# Patient Record
Sex: MALE | Race: WHITE | NOT HISPANIC OR LATINO | URBAN - METROPOLITAN AREA
[De-identification: names, ages, dates, MRNs, and addresses within clinical notes are randomized per-mention and may not be internally consistent; named-entity substitution may affect disease eponyms.]

---

## 2018-10-23 ENCOUNTER — INPATIENT (INPATIENT)
Facility: HOSPITAL | Age: 53
LOS: 0 days | Discharge: HOME | End: 2018-10-24
Attending: INTERNAL MEDICINE | Admitting: INTERNAL MEDICINE

## 2018-10-23 VITALS
OXYGEN SATURATION: 100 % | RESPIRATION RATE: 16 BRPM | DIASTOLIC BLOOD PRESSURE: 90 MMHG | SYSTOLIC BLOOD PRESSURE: 140 MMHG | TEMPERATURE: 98 F | HEART RATE: 100 BPM

## 2018-10-23 LAB
ALBUMIN SERPL ELPH-MCNC: 4.4 G/DL — SIGNIFICANT CHANGE UP (ref 3.5–5.2)
ALP SERPL-CCNC: 48 U/L — SIGNIFICANT CHANGE UP (ref 30–115)
ALT FLD-CCNC: 27 U/L — SIGNIFICANT CHANGE UP (ref 0–41)
ANION GAP SERPL CALC-SCNC: 17 MMOL/L — HIGH (ref 7–14)
APTT BLD: 27.8 SEC — SIGNIFICANT CHANGE UP (ref 27–39.2)
AST SERPL-CCNC: 33 U/L — SIGNIFICANT CHANGE UP (ref 0–41)
BASOPHILS # BLD AUTO: 0.03 K/UL — SIGNIFICANT CHANGE UP (ref 0–0.2)
BASOPHILS NFR BLD AUTO: 0.5 % — SIGNIFICANT CHANGE UP (ref 0–1)
BILIRUB SERPL-MCNC: 0.2 MG/DL — SIGNIFICANT CHANGE UP (ref 0.2–1.2)
BUN SERPL-MCNC: 13 MG/DL — SIGNIFICANT CHANGE UP (ref 10–20)
CALCIUM SERPL-MCNC: 9.2 MG/DL — SIGNIFICANT CHANGE UP (ref 8.5–10.1)
CHLORIDE SERPL-SCNC: 100 MMOL/L — SIGNIFICANT CHANGE UP (ref 98–110)
CO2 SERPL-SCNC: 23 MMOL/L — SIGNIFICANT CHANGE UP (ref 17–32)
CREAT SERPL-MCNC: 1 MG/DL — SIGNIFICANT CHANGE UP (ref 0.7–1.5)
EOSINOPHIL # BLD AUTO: 0.12 K/UL — SIGNIFICANT CHANGE UP (ref 0–0.7)
EOSINOPHIL NFR BLD AUTO: 2.2 % — SIGNIFICANT CHANGE UP (ref 0–8)
GLUCOSE SERPL-MCNC: 114 MG/DL — HIGH (ref 70–99)
HCT VFR BLD CALC: 45.8 % — SIGNIFICANT CHANGE UP (ref 42–52)
HGB BLD-MCNC: 16.4 G/DL — SIGNIFICANT CHANGE UP (ref 14–18)
IMM GRANULOCYTES NFR BLD AUTO: 0.4 % — HIGH (ref 0.1–0.3)
INR BLD: 0.92 RATIO — SIGNIFICANT CHANGE UP (ref 0.65–1.3)
LYMPHOCYTES # BLD AUTO: 1.73 K/UL — SIGNIFICANT CHANGE UP (ref 1.2–3.4)
LYMPHOCYTES # BLD AUTO: 31.1 % — SIGNIFICANT CHANGE UP (ref 20.5–51.1)
MCHC RBC-ENTMCNC: 32.3 PG — HIGH (ref 27–31)
MCHC RBC-ENTMCNC: 35.8 G/DL — SIGNIFICANT CHANGE UP (ref 32–37)
MCV RBC AUTO: 90.2 FL — SIGNIFICANT CHANGE UP (ref 80–94)
MONOCYTES # BLD AUTO: 0.71 K/UL — HIGH (ref 0.1–0.6)
MONOCYTES NFR BLD AUTO: 12.7 % — HIGH (ref 1.7–9.3)
NEUTROPHILS # BLD AUTO: 2.96 K/UL — SIGNIFICANT CHANGE UP (ref 1.4–6.5)
NEUTROPHILS NFR BLD AUTO: 53.1 % — SIGNIFICANT CHANGE UP (ref 42.2–75.2)
NRBC # BLD: 0 /100 WBCS — SIGNIFICANT CHANGE UP (ref 0–0)
PLATELET # BLD AUTO: 244 K/UL — SIGNIFICANT CHANGE UP (ref 130–400)
POTASSIUM SERPL-MCNC: 4.6 MMOL/L — SIGNIFICANT CHANGE UP (ref 3.5–5)
POTASSIUM SERPL-SCNC: 4.6 MMOL/L — SIGNIFICANT CHANGE UP (ref 3.5–5)
PROT SERPL-MCNC: 6.7 G/DL — SIGNIFICANT CHANGE UP (ref 6–8)
PROTHROM AB SERPL-ACNC: 10.6 SEC — SIGNIFICANT CHANGE UP (ref 9.95–12.87)
RBC # BLD: 5.08 M/UL — SIGNIFICANT CHANGE UP (ref 4.7–6.1)
RBC # FLD: 12.2 % — SIGNIFICANT CHANGE UP (ref 11.5–14.5)
SODIUM SERPL-SCNC: 140 MMOL/L — SIGNIFICANT CHANGE UP (ref 135–146)
TROPONIN T SERPL-MCNC: <0.01 NG/ML — SIGNIFICANT CHANGE UP
WBC # BLD: 5.57 K/UL — SIGNIFICANT CHANGE UP (ref 4.8–10.8)
WBC # FLD AUTO: 5.57 K/UL — SIGNIFICANT CHANGE UP (ref 4.8–10.8)

## 2018-10-23 RX ORDER — SODIUM CHLORIDE 9 MG/ML
2000 INJECTION INTRAMUSCULAR; INTRAVENOUS; SUBCUTANEOUS ONCE
Qty: 0 | Refills: 0 | Status: COMPLETED | OUTPATIENT
Start: 2018-10-23 | End: 2018-10-23

## 2018-10-23 RX ORDER — ENOXAPARIN SODIUM 100 MG/ML
100 INJECTION SUBCUTANEOUS ONCE
Qty: 0 | Refills: 0 | Status: COMPLETED | OUTPATIENT
Start: 2018-10-23 | End: 2018-10-23

## 2018-10-23 RX ORDER — SODIUM CHLORIDE 9 MG/ML
3 INJECTION INTRAMUSCULAR; INTRAVENOUS; SUBCUTANEOUS EVERY 8 HOURS
Qty: 0 | Refills: 0 | Status: DISCONTINUED | OUTPATIENT
Start: 2018-10-23 | End: 2018-10-24

## 2018-10-23 RX ADMIN — SODIUM CHLORIDE 500 MILLILITER(S): 9 INJECTION INTRAMUSCULAR; INTRAVENOUS; SUBCUTANEOUS at 22:35

## 2018-10-23 NOTE — ED PROVIDER NOTE - PHYSICAL EXAMINATION
VITAL SIGNS: I have reviewed nursing notes and confirm.  CONSTITUTIONAL: Well-developed; well-nourished; in no acute distress.  SKIN: Skin exam is warm and dry, no acute rash.  HEAD: Normocephalic; atraumatic.  EYES: PERRL, EOM intact; conjunctiva and sclera clear.  ENT: No nasal discharge; airway clear. TMs clear.  NECK: Supple; non tender.  CARD: S1, S2 normal; no murmurs, gallops, or rubs. Regular rate and rhythm.  RESP: No wheezes, rales or rhonchi.  ABD: Normal bowel sounds; soft; non-distended; non-tender; no hepatosplenomegaly.  EXT: Normal ROM. No clubbing, cyanosis or edema.  LYMPH: No acute cervical adenopathy.  NEURO: Alert, oriented. Grossly unremarkable. No focal deficits.  PSYCH: Cooperative, appropriate. VITAL SIGNS: I have reviewed nursing notes and confirm.  CONSTITUTIONAL: Well-developed; well-nourished; in no acute distress.  SKIN: Skin exam is warm and dry, no acute rash.  HEAD: Normocephalic; atraumatic.  EYES: PERRL, EOM intact; conjunctiva and sclera clear.  ENT: No nasal discharge; airway clear. TMs clear.  NECK: Supple; non tender.  CARD: S1, S2 normal; no murmurs, gallops, or rubs. Irregular rate and rhythm.  RESP: No wheezes, rales or rhonchi.  ABD: Normal bowel sounds; soft; non-distended; non-tender; no hepatosplenomegaly.  EXT: Normal ROM. No clubbing, cyanosis or edema.  LYMPH: No acute cervical adenopathy.  NEURO: Alert, oriented. Grossly unremarkable. No focal deficits.  PSYCH: Cooperative, appropriate.

## 2018-10-24 ENCOUNTER — TRANSCRIPTION ENCOUNTER (OUTPATIENT)
Age: 53
End: 2018-10-24

## 2018-10-24 VITALS
DIASTOLIC BLOOD PRESSURE: 66 MMHG | TEMPERATURE: 98 F | HEART RATE: 68 BPM | SYSTOLIC BLOOD PRESSURE: 121 MMHG | RESPIRATION RATE: 18 BRPM

## 2018-10-24 DIAGNOSIS — Z98.890 OTHER SPECIFIED POSTPROCEDURAL STATES: Chronic | ICD-10-CM

## 2018-10-24 LAB
ALBUMIN SERPL ELPH-MCNC: 4.1 G/DL — SIGNIFICANT CHANGE UP (ref 3.5–5.2)
ALP SERPL-CCNC: 48 U/L — SIGNIFICANT CHANGE UP (ref 30–115)
ALT FLD-CCNC: 25 U/L — SIGNIFICANT CHANGE UP (ref 0–41)
ANION GAP SERPL CALC-SCNC: 12 MMOL/L — SIGNIFICANT CHANGE UP (ref 7–14)
AST SERPL-CCNC: 25 U/L — SIGNIFICANT CHANGE UP (ref 0–41)
BASOPHILS # BLD AUTO: 0.02 K/UL — SIGNIFICANT CHANGE UP (ref 0–0.2)
BASOPHILS NFR BLD AUTO: 0.3 % — SIGNIFICANT CHANGE UP (ref 0–1)
BILIRUB SERPL-MCNC: 0.6 MG/DL — SIGNIFICANT CHANGE UP (ref 0.2–1.2)
BUN SERPL-MCNC: 10 MG/DL — SIGNIFICANT CHANGE UP (ref 10–20)
CALCIUM SERPL-MCNC: 9.1 MG/DL — SIGNIFICANT CHANGE UP (ref 8.5–10.1)
CHLORIDE SERPL-SCNC: 103 MMOL/L — SIGNIFICANT CHANGE UP (ref 98–110)
CHOLEST SERPL-MCNC: 204 MG/DL — HIGH (ref 100–200)
CK MB CFR SERPL CALC: 3.6 NG/ML — SIGNIFICANT CHANGE UP (ref 0.6–6.3)
CK SERPL-CCNC: 231 U/L — HIGH (ref 0–225)
CO2 SERPL-SCNC: 29 MMOL/L — SIGNIFICANT CHANGE UP (ref 17–32)
CREAT SERPL-MCNC: 1.1 MG/DL — SIGNIFICANT CHANGE UP (ref 0.7–1.5)
EOSINOPHIL # BLD AUTO: 0.66 K/UL — SIGNIFICANT CHANGE UP (ref 0–0.7)
EOSINOPHIL NFR BLD AUTO: 11.5 % — HIGH (ref 0–8)
ESTIMATED AVERAGE GLUCOSE: 100 MG/DL — SIGNIFICANT CHANGE UP (ref 68–114)
GLUCOSE SERPL-MCNC: 109 MG/DL — HIGH (ref 70–99)
HBA1C BLD-MCNC: 5.1 % — SIGNIFICANT CHANGE UP (ref 4–5.6)
HCT VFR BLD CALC: 47.6 % — SIGNIFICANT CHANGE UP (ref 42–52)
HDLC SERPL-MCNC: 62 MG/DL — SIGNIFICANT CHANGE UP
HGB BLD-MCNC: 16.3 G/DL — SIGNIFICANT CHANGE UP (ref 14–18)
IMM GRANULOCYTES NFR BLD AUTO: 0.2 % — SIGNIFICANT CHANGE UP (ref 0.1–0.3)
LIPID PNL WITH DIRECT LDL SERPL: 133 MG/DL — HIGH (ref 4–129)
LYMPHOCYTES # BLD AUTO: 1.77 K/UL — SIGNIFICANT CHANGE UP (ref 1.2–3.4)
LYMPHOCYTES # BLD AUTO: 30.9 % — SIGNIFICANT CHANGE UP (ref 20.5–51.1)
MAGNESIUM SERPL-MCNC: 2.2 MG/DL — SIGNIFICANT CHANGE UP (ref 1.8–2.4)
MCHC RBC-ENTMCNC: 31.3 PG — HIGH (ref 27–31)
MCHC RBC-ENTMCNC: 34.2 G/DL — SIGNIFICANT CHANGE UP (ref 32–37)
MCV RBC AUTO: 91.4 FL — SIGNIFICANT CHANGE UP (ref 80–94)
MONOCYTES # BLD AUTO: 0.78 K/UL — HIGH (ref 0.1–0.6)
MONOCYTES NFR BLD AUTO: 13.6 % — HIGH (ref 1.7–9.3)
NEUTROPHILS # BLD AUTO: 2.48 K/UL — SIGNIFICANT CHANGE UP (ref 1.4–6.5)
NEUTROPHILS NFR BLD AUTO: 43.5 % — SIGNIFICANT CHANGE UP (ref 42.2–75.2)
NRBC # BLD: 0 /100 WBCS — SIGNIFICANT CHANGE UP (ref 0–0)
PLATELET # BLD AUTO: 233 K/UL — SIGNIFICANT CHANGE UP (ref 130–400)
POTASSIUM SERPL-MCNC: 4.5 MMOL/L — SIGNIFICANT CHANGE UP (ref 3.5–5)
POTASSIUM SERPL-SCNC: 4.5 MMOL/L — SIGNIFICANT CHANGE UP (ref 3.5–5)
PROT SERPL-MCNC: 6.2 G/DL — SIGNIFICANT CHANGE UP (ref 6–8)
RBC # BLD: 5.21 M/UL — SIGNIFICANT CHANGE UP (ref 4.7–6.1)
RBC # FLD: 12.2 % — SIGNIFICANT CHANGE UP (ref 11.5–14.5)
SODIUM SERPL-SCNC: 144 MMOL/L — SIGNIFICANT CHANGE UP (ref 135–146)
T3 SERPL-MCNC: 119 NG/DL — SIGNIFICANT CHANGE UP (ref 80–200)
T4 AB SER-ACNC: 6.5 UG/DL — SIGNIFICANT CHANGE UP (ref 4.6–12)
TOTAL CHOLESTEROL/HDL RATIO MEASUREMENT: 3.3 RATIO — LOW (ref 4–5.5)
TRIGL SERPL-MCNC: 102 MG/DL — SIGNIFICANT CHANGE UP (ref 10–149)
TROPONIN T SERPL-MCNC: <0.01 NG/ML — SIGNIFICANT CHANGE UP
TSH SERPL-MCNC: 1.02 UIU/ML — SIGNIFICANT CHANGE UP (ref 0.27–4.2)
WBC # BLD: 5.72 K/UL — SIGNIFICANT CHANGE UP (ref 4.8–10.8)
WBC # FLD AUTO: 5.72 K/UL — SIGNIFICANT CHANGE UP (ref 4.8–10.8)

## 2018-10-24 RX ORDER — DILTIAZEM HCL 120 MG
120 CAPSULE, EXT RELEASE 24 HR ORAL DAILY
Qty: 0 | Refills: 0 | Status: DISCONTINUED | OUTPATIENT
Start: 2018-10-24 | End: 2018-10-24

## 2018-10-24 RX ORDER — APIXABAN 2.5 MG/1
5 TABLET, FILM COATED ORAL EVERY 12 HOURS
Qty: 0 | Refills: 0 | Status: DISCONTINUED | OUTPATIENT
Start: 2018-10-24 | End: 2018-10-24

## 2018-10-24 RX ORDER — INFLUENZA VIRUS VACCINE 15; 15; 15; 15 UG/.5ML; UG/.5ML; UG/.5ML; UG/.5ML
0.5 SUSPENSION INTRAMUSCULAR ONCE
Qty: 0 | Refills: 0 | Status: DISCONTINUED | OUTPATIENT
Start: 2018-10-24 | End: 2018-10-24

## 2018-10-24 RX ORDER — APIXABAN 2.5 MG/1
1 TABLET, FILM COATED ORAL
Qty: 60 | Refills: 2
Start: 2018-10-24 | End: 2019-01-21

## 2018-10-24 RX ORDER — DILTIAZEM HCL 120 MG
1 CAPSULE, EXT RELEASE 24 HR ORAL
Qty: 30 | Refills: 0
Start: 2018-10-24 | End: 2018-11-22

## 2018-10-24 RX ORDER — APIXABAN 2.5 MG/1
1 TABLET, FILM COATED ORAL
Qty: 60 | Refills: 1 | OUTPATIENT
Start: 2018-10-24 | End: 2018-12-22

## 2018-10-24 RX ORDER — DIGOXIN 250 MCG
0.5 TABLET ORAL ONCE
Qty: 0 | Refills: 0 | Status: COMPLETED | OUTPATIENT
Start: 2018-10-24 | End: 2018-10-24

## 2018-10-24 RX ORDER — PANTOPRAZOLE SODIUM 20 MG/1
40 TABLET, DELAYED RELEASE ORAL
Qty: 0 | Refills: 0 | Status: DISCONTINUED | OUTPATIENT
Start: 2018-10-24 | End: 2018-10-24

## 2018-10-24 RX ADMIN — ENOXAPARIN SODIUM 100 MILLIGRAM(S): 100 INJECTION SUBCUTANEOUS at 05:24

## 2018-10-24 RX ADMIN — APIXABAN 5 MILLIGRAM(S): 2.5 TABLET, FILM COATED ORAL at 15:31

## 2018-10-24 RX ADMIN — Medication 0.5 MILLIGRAM(S): at 10:19

## 2018-10-24 RX ADMIN — Medication 120 MILLIGRAM(S): at 05:24

## 2018-10-24 RX ADMIN — PANTOPRAZOLE SODIUM 40 MILLIGRAM(S): 20 TABLET, DELAYED RELEASE ORAL at 06:17

## 2018-10-24 RX ADMIN — SODIUM CHLORIDE 3 MILLILITER(S): 9 INJECTION INTRAMUSCULAR; INTRAVENOUS; SUBCUTANEOUS at 13:12

## 2018-10-24 RX ADMIN — SODIUM CHLORIDE 3 MILLILITER(S): 9 INJECTION INTRAMUSCULAR; INTRAVENOUS; SUBCUTANEOUS at 05:00

## 2018-10-24 NOTE — DISCHARGE NOTE ADULT - MEDICATION SUMMARY - MEDICATIONS TO TAKE
I will START or STAY ON the medications listed below when I get home from the hospital:    dilTIAZem 120 mg/24 hours oral capsule, extended release  -- 1 cap(s) by mouth once a day  -- Indication: For Atrial Fibrillation    Eliquis 5 mg oral tablet  -- 1 tab(s) by mouth 2 times a day   -- Check with your doctor before becoming pregnant.  It is very important that you take or use this exactly as directed.  Do not skip doses or discontinue unless directed by your doctor.  Obtain medical advice before taking any non-prescription drugs as some may affect the action of this medication.    -- Indication: For Atrial Fibrillation    pantoprazole 40 mg oral delayed release tablet  -- 1 tab(s) by mouth once a day  -- Indication: For GERD (gastroesophageal reflux disease)

## 2018-10-24 NOTE — PROGRESS NOTE ADULT - ASSESSMENT
===========================================================  TODAY: Patient is a 53 year old male with a PMHx of GERD and AFibx1 3 years ago not on anticoagulation presents with "flutter in the chest" on his way to work. Was given Cardizem 10 IV and 60 PO in ED.   ===========================================================    PLAN:    1. Atrial Fibrillation: not rate controlled  - s/p cardizem 10 IV and 60 PO in ED  - 1 dose lovenox 100mg injectable given for AC  - pt was rate controlled but was found to have a HR of 160 this morning, will start on cardizem IV drip  - cardiology consult - Dr. Pratt will see today  - f/u TSH/T3/T4  - f/u HbA1C    2. GERD  - c/w protonix 40mg daily  _________________________________________  Nutrition: Regular diet      GI PPX:                                   () Not indicated;  (x) Pantoprazole 40mg Daily    DVT PPX:  () Not indicated;  () Heparin 5000 mg SubQ;  () Lovenox 40 mg SubQ;  () SCDs  (x) anticoagulation on hold pending cardiology consult    ACTIVITY:  () Ad Riri  /  (X) Increase as Tolerated  /  () OOB w/ assist  /  () Bed Rest    BMI:  Height (cm): 180.34 (10-24)  Weight (kg): 89.2 (10-24)  BMI (kg/m2): 27.4 (10-24)    DISPO:  Patient to be discharged home when condition(s) optimized.  (X) Discussion with patient and/or family regarding goals of care.  (X) Discussed Case and Plan with the Medical Attending.    CODE STATUS  (X) FULL  () DNR / DNI    Please call Dr. James (PGY-1) with any questions/consult recs: Spectra # ===========================================================  TODAY: Patient is a 53 year old male with a PMHx of GERD and AFibx1 3 years ago not on anticoagulation presents with "flutter in the chest" on his way to work. Was given Cardizem 10 IV and 60 PO in ED.   ===========================================================    PLAN:    1. Atrial Fibrillation; [id number] (0); Hx of 1 prior episode of Afib converted to NSR  - s/p cardizem 10 IV and 60 PO in ED  - 1 dose lovenox 100mg injectable given for AC; holding anticoagulation at this time  - pt was rate controlled but was found to have a HR of 160 this morning, now in the 90s  - cardiology consult (Dr. Pratt); spoke to the team this morning and will see today  - f/u TSH/T3/T4 (received by lab)  - f/u HbA1C (received by lab)    2. GERD  - c/w protonix 40mg daily  _________________________________________  Nutrition: Regular diet      GI PPX:                                   () Not indicated;  (x) Pantoprazole 40mg Daily    DVT PPX: Ambulating often  (X) Not indicated;  () Heparin 5000 mg SubQ;  () Lovenox 40 mg SubQ;  () SCDs    ACTIVITY:  (X) Ad Riri  /  () Increase as Tolerated  /  () OOB w/ assist  /  () Bed Rest    BMI:  Height (cm): 180.34 (10-24)  Weight (kg): 89.2 (10-24)  BMI (kg/m2): 27.4 (10-24)    DISPO:  Patient to be discharged home when condition(s) optimized.  (X) Discussion with patient and/or family regarding goals of care.  (X) Discussed Case and Plan with the Medical Attending.    CODE STATUS  (X) FULL  () DNR / DNI    Please call Dr. James (PGY-1) with any questions/consult recs: Spectra #[phone] Patient is a 53 year old male with a PMHx of GERD and AFib x1 (3 years ago) presented with recurrent Afib after a weekend of heavy alcohol consumption.    ===========================================================  TODAY: No active complaints and remains asymptomatic.  ===========================================================    PLAN:  1. Atrial Fibrillation; [id number] (0); Hx of 1 prior episode of Afib converted to NSR  - s/p cardizem 10 IV and 60 PO in ED  - 1 dose lovenox 100mg injectable given for AC; holding anticoagulation at this time  - pt was rate controlled but was found to have a HR of 160 this morning, now in the 90s  - Cardiology consult (Dr. Pratt) appreciated:  ·	Continue with Cardizem CD 120mg PO QD  ·	Start Eliquis 5mg PO BID  ·	Will give Digoxin 0.5mg IVP; if not converted will give another 6 hours later  - f/u TSH/T3/T4 (received by lab)  - f/u HbA1C (received by lab)    2. GERD  - c/w protonix 40mg daily  _________________________________________  Nutrition: Regular diet      GI PPX:                                   () Not indicated;  (x) Pantoprazole 40mg Daily    DVT PPX: Ambulating often  (X) Not indicated;  () Heparin 5000 mg SubQ;  () Lovenox 40 mg SubQ;  () SCDs    ACTIVITY:  (X) Ad Riri  /  () Increase as Tolerated  /  () OOB w/ assist  /  () Bed Rest    BMI:  Height (cm): 180.34 (10-24)  Weight (kg): 89.2 (10-24)  BMI (kg/m2): 27.4 (10-24)    DISPO:  Patient to be discharged home when condition(s) optimized.  (X) Discussion with patient and/or family regarding goals of care.  (X) Discussed Case and Plan with the Medical Attending.    CODE STATUS  (X) FULL  () DNR / DNI    Please call Dr. James (PGY-1) with any questions/consult recs: Spectra #[phone] Patient is a 53 year old male with a PMHx of GERD and AFib x1 (3 years ago) presented with recurrent Afib after a weekend of heavy alcohol consumption.    ===========================================================  TODAY: No active complaints and remains asymptomatic.  ===========================================================    PLAN:  1. Atrial Fibrillation; [id number] (0); Hx of 1 prior episode of Afib converted to NSR  - s/p cardizem 10 IV and 60 PO in ED  - 1 dose lovenox 100mg injectable given for AC; holding anticoagulation at this time  - pt was rate controlled but was found to have a HR of 160 this morning, now in the 90s  - Cardiology consult (Dr. Pratt) appreciated:  ·	cardizem 180 cd per cardiology   ·	Start Eliquis 5mg PO BID per cardiology recs   ·	Will give Digoxin 0.5mg IVP; if not converted will give another 6 hours later  - f/u TSH/T3/T4 (received by lab)  - f/u HbA1C (received by lab)    2. GERD  - c/w protonix 40mg daily  _________________________________________  Nutrition: Regular diet      GI PPX:                                   () Not indicated;  (x) Pantoprazole 40mg Daily    DVT PPX: Ambulating often  (X) Not indicated;  () Heparin 5000 mg SubQ;  () Lovenox 40 mg SubQ;  () SCDs    ACTIVITY:  (X) Ad Riri  /  () Increase as Tolerated  /  () OOB w/ assist  /  () Bed Rest    BMI:  Height (cm): 180.34 (10-24)  Weight (kg): 89.2 (10-24)  BMI (kg/m2): 27.4 (10-24)    DISPO:  Patient to be discharged home when condition(s) optimized.  (X) Discussion with patient and/or family regarding goals of care.  (X) Discussed Case and Plan with the Medical Attending.    CODE STATUS  (X) FULL  () DNR / DNI    Please call Dr. James (PGY-1) with any questions/consult recs: Spectra #[phone]
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
## 2018-10-24 NOTE — H&P ADULT - NSHPLABSRESULTS_GEN_ALL_CORE
Labs                        16.4   5.57  )-----------( 244      ( 23 Oct 2018 21:21 )             45.8     10-23    140  |  100  |  13  ----------------------------<  114<H>  4.6   |  23  |  1.0    Ca    9.2      23 Oct 2018 21:21  TPro  6.7  /  Alb  4.4  /  TBili  0.2  /  DBili  x   /  AST  33  /  ALT  27  /  AlkPhos  48  10-23    PT/INR - ( 23 Oct 2018 21:57 )   PT: 10.60 sec;   INR: 0.92 ratio     PTT - ( 23 Oct 2018 21:57 )  PTT:27.8 sec    CARDIAC MARKERS ( 23 Oct 2018 21:21 )  Trop <0.01 ng/mL / CK x     / CKMB x       Pending repeat    EKG    Radio  CXR: no evidence of cardiopulmonary pathology, no cardiomegaly, pending official read Labs                        16.4   5.57  )-----------( 244      ( 23 Oct 2018 21:21 )             45.8     10-23    140  |  100  |  13  ----------------------------<  114<H>  4.6   |  23  |  1.0    Ca    9.2      23 Oct 2018 21:21  TPro  6.7  /  Alb  4.4  /  TBili  0.2  /  DBili  x   /  AST  33  /  ALT  27  /  AlkPhos  48  10-23    PT/INR - ( 23 Oct 2018 21:57 )   PT: 10.60 sec;   INR: 0.92 ratio     PTT - ( 23 Oct 2018 21:57 )  PTT:27.8 sec    CARDIAC MARKERS ( 23 Oct 2018 21:21 )  Trop <0.01 ng/mL / CK x     / CKMB x       Pending repeat    EKG: Afib with SVR    Radio  CXR: no evidence of cardiopulmonary pathology, no cardiomegaly, pending official read

## 2018-10-24 NOTE — DISCHARGE NOTE ADULT - OTHER SIGNIFICANT FINDINGS
53 year old male with a history of Gastroesophageal reflux disease and prior Atrial Fibrillation presenting for "fluttering in the chest" of same day duration. Patient reports that he had similar symptoms 3 years prior which resolved 8 hours after medications. Patient had a echocardiogram and nuclear stress test 3 years ago which were normal. Patient reports that he started experiencing palpitations today while driving to work around 4:30pm, associated with minimal lightheadedness. Patient reports that he binge drank more than the usual amount this weekend, Xray Chest 2 Views PA/Lat (10.23.18 @ 22:10)     EXAM:  XR CHEST PA LAT 2V          PROCEDURE DATE:  10/23/2018      INTERPRETATION:  Clinical History / Reason for exam: Chest pain.    Comparison : Chest radiograph None.    Technique/Positioning: Frontal and lateral.    Findings:    Support devices: None.    Cardiac/mediastinum/hilum: Unremarkable.    Lung parenchyma/Pleura: Within normal limits.    Skeleton/soft tissues: Unremarkable.    Impression:    ·	No radiographic evidence of acute cardiopulmonary disease.

## 2018-10-24 NOTE — PROGRESS NOTE ADULT - SUBJECTIVE AND OBJECTIVE BOX
DAILY PROGRESS NOTE  ===========================================================  Patient Information:  DIANDRA JOAQUIN  /  53y  /  Male  /  MRN#: [id number]    Working Diagnosis:  1. Atrial fibrillation    Hospital Day: 1d     Past Medical History:   GERD (gastroesophageal reflux disease)  Afib not on AC    Past Surgical History:  Umbilical hernia repair    Family History:  Family history of atrial fibrillation (Father)  Family history of diabetes mellitus (Father)    |:::::::::::::::::::::::::::| SUBJECTIVE |:::::::::::::::::::::::::::|    OVERNIGHT EVENTS: no overnight events    TODAY: Patient is a 53 year old male with a PMHx of GERD and AFib x1 3 years ago who presents with "fluttering in the chest". Admit date is 10/23.     Today patient is seen sitting up comfortably in bed eating breakfast. He no longer feels the palpitations in his chest or any lightheadedness. Patient denies chest pain, headache, dizziness, changes in vision, nausea or vomiting.     Review of systems is otherwise negative.    |:::::::::::::::::::::::::::| OBJECTIVE |:::::::::::::::::::::::::::|    VITAL SIGNS: Last 24 Hours  T(C): 35.8 (24 Oct 2018 05:43), Max: 36.7 (23 Oct 2018 19:57)  T(F): 96.4 (24 Oct 2018 05:43), Max: 98 (23 Oct 2018 19:57)  HR: 160 (24 Oct 2018 7:45)  BP: 132/73 (24 Oct 2018 05:43) (117/78 - 140/90)  BP(mean): --  RR: 18 (24 Oct 2018 05:43) (16 - 18)  SpO2: 99% (24 Oct 2018 06:25) (98% - 100%)    PHYSICAL EXAM:  GENERAL:   Awake, AOx3; NAD  HEENT:  Head NC/AT; Conjunctivae pink, Sclera anicteric & non-injected; Oral mucosa moist.  NECK:   Supple.  CARDIO:   Afib, tachycardic, no M/R/G appreciated.  RESP:   Equal expansion; Good entry BL; No rales or rhonchi appreciated.  GI:   Soft; NT/ND; BS; No guarding; No rebound tenderness.  EXT:   UE and LE tone intact; UE and LE strength 5/5; No edema in UE and LE.  NEURO:   PERRL; EOMI; CN II-XII grossly intact.  SKIN:   Intact    LAB RESULTS:                        16.3   5.72  )-----------( 233      ( 24 Oct 2018 06:22 )             47.6     10-24    144  |  103  |  10  ----------------------------<  109<H>  4.5   |  29  |  1.1    Ca    9.1      24 Oct 2018 06:22  Mg     2.2     10-24    TPro  6.2  /  Alb  4.1  /  TBili  0.6  /  DBili  x   /  AST  25  /  ALT  25  /  AlkPhos  48  10-24    PT/INR - ( 23 Oct 2018 21:57 )   PT: 10.60 sec;   INR: 0.92 ratio       PTT - ( 23 Oct 2018 21:57 )  PTT:27.8 sec    Creatine Kinase, Serum: 231 U/L <H> (10-24-18 @ 06:22)  Troponin T, Serum: <0.01 ng/mL (10-24-18 @ 06:22)  Troponin T, Serum: <0.01 ng/mL (10-23-18 @ 21:21)    CKMB 3.6    CARDIAC MARKERS ( 24 Oct 2018 06:22 )  x     / <0.01 ng/mL / 231 U/L / x     / 3.6 ng/mL  CARDIAC MARKERS ( 23 Oct 2018 21:21 )  x     / <0.01 ng/mL / x     / x     / x        ALLERGIES:  No Known Allergies    INPATIENT MEDICATIONS:  diltiazem    milliGRAM(s) Oral daily  influenza   Vaccine 0.5 milliLiter(s) IntraMuscular once  pantoprazole    Tablet 40 milliGRAM(s) Oral before breakfast  sodium chloride 0.9% lock flush 3 milliLiter(s) IV Push every 8 hours    HOME MEDICATIONS:  pantoprazole 40 mg oral delayed release tablet: 1 tab(s) orally once a day (24 Oct 2018 01:17)    ------------------------------------------------------------------------------------------------------------ DAILY PROGRESS NOTE  ===========================================================    Patient Information:  DIANDRA JOAQUIN  /  53y  /  Male  /  MRN#: [id number]    Working Diagnosis:  1. Atrial fibrillation with RVR; Asymptomatic; [id number] (0)    Hospital Day: 1d     Past Medical History:   GERD (gastroesophageal reflux disease)  Afib not on AC    |:::::::::::::::::::::::::::| SUBJECTIVE |:::::::::::::::::::::::::::|    OVERNIGHT EVENTS: no overnight events reported    TODAY: Patient was seen a bedside this morning. He was sitting on the edge of the bed eating breakfast comfortably. He no longer feels the palpitations in his chest or any lightheadedness. Patient denies chest pain, headache, dizziness, changes in vision, nausea or vomiting.     Review of systems is otherwise negative.    |:::::::::::::::::::::::::::| OBJECTIVE |:::::::::::::::::::::::::::|    VITAL SIGNS: Last 24 Hours  T(F): 96.4 (24 Oct 2018 05:43), Max: 98 (23 Oct 2018 19:57)  HR: 87 (24 Oct 2018 05:43) (87 - 100)  BP: 132/73 (24 Oct 2018 05:43) (117/78 - 140/90)  RR: 18 (24 Oct 2018 05:43) (16 - 18)  SpO2: 99% (24 Oct 2018 06:25) (98% - 100%)    PHYSICAL EXAM:  GENERAL:   Awake, AOx3; NAD  HEENT:  Head NC/AT; Conjunctivae pink, Sclera anicteric & non-injected; Oral mucosa moist.  NECK:   Supple.  CARDIO:   Rapid rate; Irregular rhythm; no M/R/G appreciated.  RESP:   Equal expansion; Good entry BL; No rales or rhonchi appreciated.  GI:   Soft; NT/ND; BS; No guarding; No rebound tenderness.  EXT:   UE and LE tone intact; UE and LE strength 5/5; No edema in UE and LE.  NEURO:   PERRL; EOMI; CN II-XII grossly intact.  SKIN:   Intact    LAB RESULTS:                        16.3   5.72  )-----------( 233      ( 24 Oct 2018 06:22 )             47.6     144  |  103  |  10  ----------------------------<  109<H>  4.5   |  29  |  1.1    Ca    9.1       Mg     2.2         TPro  6.2  /  Alb  4.1  /  TBili  0.6  /  DBili  x   /  AST  25  /  ALT  25  /  AlkPhos  48     PT: 10.60 sec;   INR: 0.92 ratio    PTT:27.8 sec    Creatine Kinase, Serum: 231 U/L <H> (10-24-18 @ 06:22)  Troponin T, Serum: <0.01 ng/mL (10-24-18 @ 06:22)  Troponin T, Serum: <0.01 ng/mL (10-23-18 @ 21:21)    CKMB 3.6    CARDIAC MARKERS ( 24 Oct 2018 06:22 )  x     / <0.01 ng/mL / 231 U/L / x     / 3.6 ng/mL  CARDIAC MARKERS ( 23 Oct 2018 21:21 )  x     / <0.01 ng/mL / x     / x     / x        ALLERGIES:  No Known Allergies    INPATIENT MEDICATIONS:  diltiazem    milliGRAM(s) Oral daily  influenza   Vaccine 0.5 milliLiter(s) IntraMuscular once  pantoprazole    Tablet 40 milliGRAM(s) Oral before breakfast  sodium chloride 0.9% lock flush 3 milliLiter(s) IV Push every 8 hours    HOME MEDICATIONS:  pantoprazole 40 mg oral delayed release tablet: 1 tab(s) orally once a day (24 Oct 2018 01:17)    ------------------------------------------------------------------------------------------------------------

## 2018-10-24 NOTE — DISCHARGE NOTE ADULT - CARE PLAN
Principal Discharge DX:	Rapid atrial fibrillation  Goal:	Medical management  Assessment and plan of treatment:	You presented to the hospital after experiencing palpitations. EKG confirmed atrial fibrillation with rapid ventricular rate. At the hospital, you were given medications (Cardizem and Digoxin), and you converted back to normal sinus rhythm (Heart rate in the 60s). Please take the Eliquis and Cardizem as directed and follow up with Dr. Pratt as an outpatient (Keep your next appointment). You should return to the hospital if you experience similar symptoms in the future. Please try to avoid known triggers such as excessive alcohol.

## 2018-10-24 NOTE — DISCHARGE NOTE ADULT - PATIENT PORTAL LINK FT
You can access the [facility] Patient Portal, offered by [facility], by registering with the following website: [email or web address]

## 2018-10-24 NOTE — DISCHARGE NOTE ADULT - CARE PROVIDER_API CALL
Fadi Pratt), Cardiovascular Disease; Interventional Cardiology  [address] [place] [address]  Phone: [phone]  Fax: [phone]

## 2018-10-24 NOTE — ED ADULT NURSE NOTE - NSIMPLEMENTINTERV_GEN_ALL_ED
Implemented All Universal Safety Interventions:  [place] to call system. Call bell, personal items and telephone within reach. Instruct patient to call for assistance. Room bathroom lighting operational. Non-slip footwear when patient is off stretcher. Physically safe environment: no spills, clutter or unnecessary equipment. Stretcher in lowest position, wheels locked, appropriate side rails in place.

## 2018-10-24 NOTE — ED ADULT NURSE REASSESSMENT NOTE - NS ED NURSE REASSESS COMMENT FT1
10/23/2018 22:30 Patient is on continous cardiac monitering . medicated with cardizem as ordered . Patient tolerated well

## 2018-10-24 NOTE — DISCHARGE NOTE ADULT - HOSPITAL COURSE
This is a 53 year old male with a history of Atrial Fibrillation (convertedto NSR with Digoxin IV push) presenting with palpitations x1 day. He had similar symptoms 3 years ago, which resolved 8 hours after adequate therapy. Patient had a echocardiogram and nuclear stress test 3 years ago which were normal. He admit to binge drinking more than the usual amount this past weekend. After receiving 0.5mg Digoxin IV push, he converted back to normal sinus rhythm. He is being discharged with Eliquis and Cardizem. He has a follow up appointment with Dr. Pratt.

## 2018-10-24 NOTE — H&P ADULT - ASSESSMENT
Atrial fibrillation  - Status post Diltiazem 10 IV push and Diltiazem 60 PO  - Currently rate controlled    HbA1c, Lipid profile, TSH/T3 total/T4 Atrial fibrillation  - Status post Diltiazem 10 IV push and Diltiazem 60 PO. Started on Lovenox 100 bid  - Currently rate controlled    HbA1c, Lipid profile, TSH/T3 total/T4  DVT prophylaxis; Lovenox therapeutic  Ambulate as tolerated  Diet; antireflux Atrial fibrillation  - Status post Diltiazem 10 IV push and Diltiazem 60 PO. Given stat dose of Lovenox 100 for therapeutic anticoagulation  - Currently rate controlled  - Admit to telemetry unit  - Cardiology consult Dr Pratt    HbA1c, Lipid profile, TSH/T3 total/T4  DVT prophylaxis; Lovenox therapeutic  Ambulate as tolerated  Diet; antireflux 53 year old male with a history of Gastroesophageal reflux disease and prior Atrial Fibrillation presenting for "fluttering in the chest" of same day duration.    Atrial fibrillation  - Status post Diltiazem 10 IV push and Diltiazem 60 PO. Given stat dose of Lovenox 100 for therapeutic anticoagulation.  - Currently rate controlled   - If HR > 115-120 sustained, give another push of Diltiazem 10  - Start patient on Cardizem 120 ER if still pending cardio consult  - Admit to telemetry unit  - Cardiology consult Dr Pratt  - Will hold of on anticoagulation for now until assessment by Dr Pratt.    HbA1c, Lipid profile, TSH/T3 total/T4  DVT prophylaxis; Lovenox therapeutic  Ambulate as tolerated  Diet; antireflux

## 2018-10-24 NOTE — CONSULT NOTE ADULT - SUBJECTIVE AND OBJECTIVE BOX
DIANDRA JOAQUIN 53y (1965) Male    Daily Height in cm: 180.34 (24 Oct 2018 01:51)    Daily Weight in k.2 (24 Oct 2018 01:51)    Patient is a 53y old  Male who presents with a chief complaint of Atrial Fibrillation (24 Oct 2018 08:47)    HPI:  53 year old male with a history of Gastroesophageal reflux disease and prior Atrial Fibrillation presenting for "fluttering in the chest" of same day duration. Patient reports that he had similar symptoms 3 years prior which resolved 8 hours after medications. Patient had a echocardiogram and nuclear stress test 3 years ago which were normal. Patient reports that he started experiencing palpitations today while driving to work around 4:30pm, associated with minimal lightheadedness. Patient reports that he binge drank more than the usual amount this weekend, around 8 beers. Denies chest pain, shortness of breath, diaphoresis, headache, blurry vision, diplopia, lower extremity erythema/tenderness/warmth.  ED: Lovenox 100, NS 2L bolus, Diltiazem 10 IV push, Diltiazem 60 PO (24 Oct 2018 00:44)    Cardiology addendum  above reviewed. pt interviewed an examined.   he had 4 days of binge drinking with his friends and family and felt palpitations and hence came in. no cp no dyspnea no orthopnea no pnd.   does not feel palpitations on a regular basis.   ET is unlimited without any symptoms.   is only on meds for GERD.   was tested for sleep apnea and said it was mildly abnormal and he could not tolerate mask.     PAST MEDICAL & SURGICAL HISTORY:  GERD (gastroesophageal reflux disease)  Afib  H/O umbilical hernia repair    FAMILY HISTORY:  Family history of atrial fibrillation (Father)  Family history of diabetes mellitus (Father)      Home Medications:  pantoprazole 40 mg oral delayed release tablet: 1 tab(s) orally once a day (24 Oct 2018 01:17)      REVIEW OF SYSTEMS:    CONSTITUTIONAL: No weakness, fevers or chills  EYES/ENT: No visual changes;  No vertigo or throat pain   NECK: No pain or stiffness  RESPIRATORY: see HPI  CARDIOVASCULAR: see HPI  GASTROINTESTINAL: No abdominal or epigastric pain. No nausea, vomiting, or hematemesis; No diarrhea or constipation. No melena or hematochezia.  GENITOURINARY: No dysuria, frequency or hematuria  NEUROLOGICAL: No numbness or weakness  SKIN: No itching, rashes    ON EXAM:  Vital Signs Last 24 Hrs  T(C): 35.8 (24 Oct 2018 05:43), Max: 36.7 (23 Oct 2018 19:57)  T(F): 96.4 (24 Oct 2018 05:43), Max: 98 (23 Oct 2018 19:57)  HR: 87 (24 Oct 2018 05:43) (87 - 100)  BP: 132/73 (24 Oct 2018 05:43) (117/78 - 140/90)  BP(mean): --  RR: 18 (24 Oct 2018 05:43) (16 - 18)  SpO2: 99% (24 Oct 2018 06:25) (98% - 100%)    GENERAL: NAD  SKIN: No rashes or lesions  HEAD:  Atraumatic, Normocephalic  EYES:  conjunctiva and sclera clear  ENMT: Moist mucous membranes  NECK: Supple, No JVD  CHEST/LUNG: CTA B/L.  HEART: S1, S2  irregular, S1 variable intensity.appreciated.  ABDOMEN/GI: Soft, Nontender, Nondistended; Bowel sounds present  EXTREMITIES:  2+ Peripheral Pulses. No edema  NERVOUS SYSTEM:  Alert & Oriented X3, Good concentration    EKG afib vr 112/min qtc 428 MS    LABS:    CARDIAC MARKERS ( 24 Oct 2018 06:22 )  x     / <0.01 ng/mL / 231 U/L / x     / 3.6 ng/mL  CARDIAC MARKERS ( 23 Oct 2018 21:21 )  x     / <0.01 ng/mL / x     / x     / x                             16.3   5.72  )-----------( 233      ( 24 Oct 2018 06:22 )             47.6       CBC Full  -  ( 24 Oct 2018 06:22 )  WBC Count : 5.72 K/uL  Hemoglobin : 16.3 g/dL  Hematocrit : 47.6 %  Platelet Count - Automated : 233 K/uL  Mean Cell Volume : 91.4 fL  Mean Cell Hemoglobin : 31.3 pg  Mean Cell Hemoglobin Concentration : 34.2 g/dL  Auto Neutrophil # : 2.48 K/uL  Auto Lymphocyte # : 1.77 K/uL  Auto Monocyte # : 0.78 K/uL  Auto Eosinophil # : 0.66 K/uL  Auto Basophil # : 0.02 K/uL  Auto Neutrophil % : 43.5 %  Auto Lymphocyte % : 30.9 %  Auto Monocyte % : 13.6 %  Auto Eosinophil % : 11.5 %  Auto Basophil % : 0.3 %      10-24    144  |  103  |  10  ----------------------------<  109<H>  4.5   |  29  |  1.1    Ca    9.1      24 Oct 2018 06:22  Mg     2.2     10-24    TPro  6.2  /  Alb  4.1  /  TBili  0.6  /  DBili  x   /  AST  25  /  ALT  25  /  AlkPhos  48  10-24      MEDICATIONS  (STANDING):  diltiazem    milliGRAM(s) Oral daily  influenza   Vaccine 0.5 milliLiter(s) IntraMuscular once  pantoprazole    Tablet 40 milliGRAM(s) Oral before breakfast  sodium chloride 0.9% lock flush 3 milliLiter(s) IV Push every 8 hours    MEDICATIONS  (PRN):      10-24-18 @ 09:37

## 2018-10-24 NOTE — DISCHARGE NOTE ADULT - PLAN OF CARE
Medical management You presented to the hospital after experiencing palpitations. EKG confirmed atrial fibrillation with rapid ventricular rate. At the hospital, you were given medications (Cardizem and Digoxin), and you converted back to normal sinus rhythm (Heart rate in the 60s). Please take the Eliquis and Cardizem as directed and follow up with Dr. Pratt as an outpatient (Keep your next appointment). You should return to the hospital if you experience similar symptoms in the future. Please try to avoid known triggers such as excessive alcohol.

## 2018-10-24 NOTE — H&P ADULT - FAMILY HISTORY
Father  Still living? Unknown  Family history of diabetes mellitus, Age at diagnosis: Age Unknown  Family history of atrial fibrillation, Age at diagnosis: Age Unknown

## 2018-10-24 NOTE — CONSULT NOTE ADULT - ASSESSMENT
Paroxysmal atrial fibrillation with rapid rate on presentation.   now rate moderate control  Holiday Heart syndrome.   RYAN    Adv  get echo  Digoxin 0.5 mg iv push x 1 now.  cardizem cd 120 mg once a day for now.   eliquis 5 mg bid.     if hr is <100 at rest then dc home.   pt counseled about medication and follow up compliance.     PT has appt to see me on nov 9th apparently.     Please recall us  if any issues with echo or heart rate.

## 2018-10-24 NOTE — H&P ADULT - HISTORY OF PRESENT ILLNESS
53 year old male with no significant past medical history presenting for "fluttering in the chest" of same day duration. Patient reports that he had similar symptoms 3 years prior which resolved on its own.  Patient reports that he started experiencing palpitations today at rest around 4:30pm, associated with lightheadedness. Patient reports that he binge drank more than the usual amount this weekend. Denies chest pain, shortness of breath, diaphoresis, headache, blurry vision, diplopia, lower extremity erythema/tenderness/warmth.    ED: Lovenox 100, NS 2L bolus, Diltiazem 10 IV push, Diltiazem 60 PO 53 year old male with a history of Gastroesophageal reflux disease and possible prior Atrial Fibrillation presenting for "fluttering in the chest" of same day duration. Patient reports that he had similar symptoms 3 years prior which resolved on its own.  Patient reports that he started experiencing palpitations today at rest around 4:30pm, associated with lightheadedness. Patient reports that he binge drank more than the usual amount this weekend. Denies chest pain, shortness of breath, diaphoresis, headache, blurry vision, diplopia, lower extremity erythema/tenderness/warmth.    ED: Lovenox 100, NS 2L bolus, Diltiazem 10 IV push, Diltiazem 60 PO 53 year old male with a history of Gastroesophageal reflux disease and prior Atrial Fibrillation presenting for "fluttering in the chest" of same day duration. Patient reports that he had similar symptoms 3 years prior which resolved 8 hours after medications. Patient had a echocardiogram and nuclear stress test 3 years ago which were normal. Patient reports that he started experiencing palpitations today while driving to work around 4:30pm, associated with minimal lightheadedness. Patient reports that he binge drank more than the usual amount this weekend, around 8 beers. Denies chest pain, shortness of breath, diaphoresis, headache, blurry vision, diplopia, lower extremity erythema/tenderness/warmth.    ED: Lovenox 100, NS 2L bolus, Diltiazem 10 IV push, Diltiazem 60 PO

## 2018-10-24 NOTE — H&P ADULT - NSHPPHYSICALEXAM_GEN_ALL_CORE
Vital Signs Last 24 Hrs  T(C): 36.4 (24 Oct 2018 00:20), Max: 36.7 (23 Oct 2018 19:57)  T(F): 97.6 (24 Oct 2018 00:20), Max: 98 (23 Oct 2018 19:57)  HR: 100 (24 Oct 2018 00:20) (100 - 100)  BP: 117/78 (24 Oct 2018 00:20) (117/78 - 140/90)  BP(mean): --  RR: 18 (24 Oct 2018 00:20) (16 - 18)  SpO2: 98% (24 Oct 2018 00:20) (98% - 100%)    Constitutional:  HEENT:  Cardiac:  Lungs:  Abdomen:  Extremities:  Skin:  Psych:  Neuro: Vital Signs Last 24 Hrs  T(C): 36.4 (24 Oct 2018 00:20), Max: 36.7 (23 Oct 2018 19:57)  T(F): 97.6 (24 Oct 2018 00:20), Max: 98 (23 Oct 2018 19:57)  HR: 100 (24 Oct 2018 00:20) (100 - 100)  BP: 117/78 (24 Oct 2018 00:20) (117/78 - 140/90)  BP(mean): --  RR: 18 (24 Oct 2018 00:20) (16 - 18)  SpO2: 98% (24 Oct 2018 00:20) (98% - 100%)    Constitutional: well developed, not in acute distress  HEENT: NCAT, EOMI, PERRLA, moist mucous membranes  Cardiac: S1 S2, Atrial fibrillation with SVR, no audible murmurs  Lungs: CTAB, GBAE, no crackles no wheezes  Abdomen: +BS, soft, NTND  Extremities: +2 PP b/l, -ve LLE b/l  Skin: No rashes  Psych: Normal affect  Neuro: AAOx3, motor 5/5, sensation intact

## 2018-10-25 LAB — T3FREE SERPL-MCNC: 3.83 PG/ML — SIGNIFICANT CHANGE UP (ref 1.8–4.6)

## 2018-10-30 DIAGNOSIS — G47.33 OBSTRUCTIVE SLEEP APNEA (ADULT) (PEDIATRIC): ICD-10-CM

## 2018-10-30 DIAGNOSIS — Z82.49 FAMILY HISTORY OF ISCHEMIC HEART DISEASE AND OTHER DISEASES OF THE CIRCULATORY SYSTEM: ICD-10-CM

## 2018-10-30 DIAGNOSIS — K21.9 GASTRO-ESOPHAGEAL REFLUX DISEASE WITHOUT ESOPHAGITIS: ICD-10-CM

## 2018-10-30 DIAGNOSIS — I48.0 PAROXYSMAL ATRIAL FIBRILLATION: ICD-10-CM

## 2018-10-30 DIAGNOSIS — Z98.890 OTHER SPECIFIED POSTPROCEDURAL STATES: ICD-10-CM

## 2018-10-30 DIAGNOSIS — Z83.3 FAMILY HISTORY OF DIABETES MELLITUS: ICD-10-CM

## 2018-10-30 DIAGNOSIS — Z57.8 OCCUPATIONAL EXPOSURE TO OTHER RISK FACTORS: ICD-10-CM

## 2018-10-30 SDOH — HEALTH STABILITY - PHYSICAL HEALTH: OCCUPATIONAL EXPOSURE TO OTHER RISK FACTORS: Z57.8

## 2022-10-19 NOTE — PATIENT PROFILE ADULT - NSASFUNCLEVELADLTRANSFER_GEN_A_NUR
October 26, 2022      Emerson Garcia  [address] [facility] [address]        Dear ,    We are writing to inform you of your test results.    Your cholesterol was due so I added it to your labs when you were in the emergency department.  Your cholesterol is well controlled.    Resulted Orders   Lipid panel reflex to direct LDL Non-fasting   Result Value Ref Range    Cholesterol 134 <200 mg/dL    Triglycerides 141 <150 mg/dL    Direct Measure HDL 38 (L) >=40 mg/dL    LDL Cholesterol Calculated 68 <=100 mg/dL    Non HDL Cholesterol 96 <130 mg/dL    Narrative    Cholesterol  Desirable:  <200 mg/dL    Triglycerides  Normal:  Less than 150 mg/dL  Borderline High:  150-199 mg/dL  High:  200-499 mg/dL  Very High:  Greater than or equal to 500 mg/dL    Direct Measure HDL  Female:  Greater than or equal to 50 mg/dL   Male:  Greater than or equal to 40 mg/dL    LDL Cholesterol  Desirable:  <100mg/dL  Above Desirable:  100-129 mg/dL   Borderline High:  130-159 mg/dL   High:  160-189 mg/dL   Very High:  >= 190 mg/dL    Non HDL Cholesterol  Desirable:  130 mg/dL  Above Desirable:  130-159 mg/dL  Borderline High:  160-189 mg/dL  High:  190-219 mg/dL  Very High:  Greater than or equal to 220 mg/dL       If you have any questions or concerns, please call the clinic at the number listed above.       Sincerely,      Chris Scott MD           0 = independent

## 2022-12-12 NOTE — ED ADULT NURSE NOTE - NS ED NURSE LEVEL OF CONSCIOUSNESS ORIENTATION
Oriented - self; Oriented - place; Oriented - time Low Dose Naltrexone Counseling- I discussed with the patient the potential risks and side effects of low dose naltrexone including but not limited to: more vivid dreams, headaches, nausea, vomiting, abdominal pain, fatigue, dizziness, and anxiety.

## 2023-06-01 ENCOUNTER — APPOINTMENT (OUTPATIENT)
Dept: ORTHOPEDIC SURGERY | Facility: CLINIC | Age: 58
End: 2023-06-01
Payer: OTHER MISCELLANEOUS

## 2023-06-01 VITALS — BODY MASS INDEX: 29.12 KG/M2 | HEIGHT: 72 IN | WEIGHT: 215 LBS

## 2023-06-01 DIAGNOSIS — S49.92XA UNSPECIFIED INJURY OF LEFT SHOULDER AND UPPER ARM, INITIAL ENCOUNTER: ICD-10-CM

## 2023-06-01 PROBLEM — K21.9 GASTRO-ESOPHAGEAL REFLUX DISEASE WITHOUT ESOPHAGITIS: Chronic | Status: ACTIVE | Noted: 2018-10-24

## 2023-06-01 PROBLEM — I48.91 UNSPECIFIED ATRIAL FIBRILLATION: Chronic | Status: ACTIVE | Noted: 2018-10-24

## 2023-06-01 PROBLEM — Z00.00 ENCOUNTER FOR PREVENTIVE HEALTH EXAMINATION: Status: ACTIVE | Noted: 2023-06-01

## 2023-06-01 PROCEDURE — 99203 OFFICE O/P NEW LOW 30 MIN: CPT | Mod: ACP

## 2023-06-01 NOTE — HISTORY OF PRESENT ILLNESS
[de-identified] : 57-year-old male here for an evaluation of injury sustained to the left shoulder, patient states that this injury happened on May 12, 2023, he states that he slipped on the stairs hurt his lower back and left shoulder, patient was seen by a medical provider who advised for physical therapy and an MRI of the left shoulder.\par Patient states that he has noticed significant improvement with physical therapy.\par Patient is ready to return to work.

## 2023-06-01 NOTE — DISCUSSION/SUMMARY
[de-identified] : Impression: Status post left shoulder injury.\par \par Plan: Serial exam of the left shoulder is unremarkable, there is significant degenerative changes over the left shoulder according to the MRI, but patient has good motor strength, excellent range of motion and no signs of instability about the shoulder.\par Patient can return to work full duty with no limitations.\par \par Follow-up: As needed\par \par

## 2023-06-01 NOTE — IMAGING
[de-identified] : On examination of the left shoulder, patient has no swelling, no ecchymosis, no erythema.\par Full range of motion to the Apley scratch.\par Patient has no focal tenderness palpation of the left shoulder.\par Patient has good motor strength to internal rotation and external rotation, forward flexion and abduction.\par Negative drop arm test.\par Nontender the AC joint.\par Negative speeds, negative apprehension, negative impingement.\par Negative cross abduction of the shoulder.\par Negative resisted supraspinatus.\par Negative Otis's.\par Neurovascular intact\par \par No x-ray was done in office today.\par \par MRI report of the left shoulder was brought by the patient and saved in our system.\par This MRI shows severe glenohumeral osteoarthritis with high-grade chondral loss along the posterior aspect of the glenoid, bony glenoid erosion contributing to increased glenoid retroversion.  Chondral loss along the superior and posterior aspect of the humeral head with marginal osteophytes.  Diffuse degenerative torn glenoid labrum.  There is a small joint effusion with debris's and joint bodies.\par Moderate supraspinatus, infraspinatus and upper subscapularis tendinosis.  There is a small low-grade tear of the posterior footprint of the supraspinatus tendon, extending into the anterior infraspinatus tendon.  There is no high-grade or full-thickness tear.

## 2024-03-19 ENCOUNTER — APPOINTMENT (OUTPATIENT)
Dept: ORTHOPEDIC SURGERY | Facility: CLINIC | Age: 59
End: 2024-03-19
Payer: COMMERCIAL

## 2024-03-19 PROCEDURE — 99213 OFFICE O/P EST LOW 20 MIN: CPT

## 2024-03-19 PROCEDURE — 72100 X-RAY EXAM L-S SPINE 2/3 VWS: CPT

## 2024-03-22 NOTE — ASSESSMENT
[FreeTextEntry1] : At this time orthopedically he is stable he is engaged in physical therapy I asked that at the next visit he provide the op note and MRI for the right shoulder  he is on Plavix so i would be hesitant to consider NSAIDs no reason for any injections I deferred on any lower back diagnostics today we might want to consider some physical therapy  based on his history symptoms and findings I do believe he is "totally disabled unable to work and I will see him in a few months

## 2024-03-22 NOTE — IMAGING
[de-identified] : Pleasant easy to examine no distress neck has good motion mild spasm right shoulder guarded motion in all planes tested with some apprehension clinically located healed incisions biceps appears intact elbow motion is full  Left shoulder mild moderate limits in motion mild impingement  MRI left shoulder 5/22/2023:This MRI shows severe glenohumeral osteoarthritis with high-grade chondral loss along the posterior aspect of the glenoid, bony glenoid erosion contributing to increased glenoid retroversion. Chondral loss along the superior and posterior aspect of the humeral head with marginal osteophytes. Diffuse degenerative torn glenoid labrum. There is a small joint effusion with debris's and joint bodies.  Moderate supraspinatus, infraspinatus and upper subscapularis tendinosis. There is a small low-grade tear of the posterior footprint of the supraspinatus tendon, extending into the anterior infraspinatus tendon. There is no high-grade or full-thickness tear.  Lumbar spine tight dorsolumbar fascia and hamstrings negative straight leg raise bilaterally normal gait X-rays lumbar spine sacral spine today mild discogenic disease

## 2024-03-22 NOTE — HISTORY OF PRESENT ILLNESS
[de-identified] : This is a 58-year-old gentleman retired from the [employer] 35 years of service in January recently had surgery for his right shoulder by Dr. Cardenas with several anchors in the biceps and rotator cuff ongoing therapy right now with Donaldo Almendarez he had a fall down a flight of stairs while still on the job November 16, 2023 he does have difficulty sleeping pain at rest for activity 6 no allergies does not smoke he is on Plavix did have a cardiac event last year but has  no stents.  He did have an injury to the left shoulder back in May 2023 when he slipped on some stairs injuring his back and left shoulder he did return to work from that injury Does report some persistent pain in the lower lumbar region into both buttocks morning stiffness

## 2024-04-24 ENCOUNTER — EMERGENCY (EMERGENCY)
Facility: HOSPITAL | Age: 59
LOS: 0 days | Discharge: HOME | End: 2024-04-24
Attending: STUDENT IN AN ORGANIZED HEALTH CARE EDUCATION/TRAINING PROGRAM
Payer: COMMERCIAL

## 2024-04-24 VITALS
TEMPERATURE: 98 F | HEART RATE: 54 BPM | SYSTOLIC BLOOD PRESSURE: 142 MMHG | WEIGHT: 199.96 LBS | DIASTOLIC BLOOD PRESSURE: 92 MMHG | OXYGEN SATURATION: 99 % | RESPIRATION RATE: 16 BRPM

## 2024-04-24 DIAGNOSIS — K21.9 GASTRO-ESOPHAGEAL REFLUX DISEASE WITHOUT ESOPHAGITIS: ICD-10-CM

## 2024-04-24 DIAGNOSIS — R35.0 FREQUENCY OF MICTURITION: ICD-10-CM

## 2024-04-24 DIAGNOSIS — Z98.890 OTHER SPECIFIED POSTPROCEDURAL STATES: Chronic | ICD-10-CM

## 2024-04-24 DIAGNOSIS — I48.0 PAROXYSMAL ATRIAL FIBRILLATION: ICD-10-CM

## 2024-04-24 DIAGNOSIS — R39.15 URGENCY OF URINATION: ICD-10-CM

## 2024-04-24 LAB
ALBUMIN SERPL ELPH-MCNC: 4.5 G/DL — SIGNIFICANT CHANGE UP (ref 3.5–5.2)
ALP SERPL-CCNC: 48 U/L — SIGNIFICANT CHANGE UP (ref 30–115)
ALT FLD-CCNC: 24 U/L — SIGNIFICANT CHANGE UP (ref 0–41)
ANION GAP SERPL CALC-SCNC: 9 MMOL/L — SIGNIFICANT CHANGE UP (ref 7–14)
APPEARANCE UR: CLEAR — SIGNIFICANT CHANGE UP
AST SERPL-CCNC: 22 U/L — SIGNIFICANT CHANGE UP (ref 0–41)
BASOPHILS # BLD AUTO: 0.02 K/UL — SIGNIFICANT CHANGE UP (ref 0–0.2)
BASOPHILS NFR BLD AUTO: 0.5 % — SIGNIFICANT CHANGE UP (ref 0–1)
BILIRUB SERPL-MCNC: 0.5 MG/DL — SIGNIFICANT CHANGE UP (ref 0.2–1.2)
BILIRUB UR-MCNC: NEGATIVE — SIGNIFICANT CHANGE UP
BUN SERPL-MCNC: 15 MG/DL — SIGNIFICANT CHANGE UP (ref 10–20)
CALCIUM SERPL-MCNC: 9.6 MG/DL — SIGNIFICANT CHANGE UP (ref 8.4–10.5)
CHLORIDE SERPL-SCNC: 103 MMOL/L — SIGNIFICANT CHANGE UP (ref 98–110)
CO2 SERPL-SCNC: 28 MMOL/L — SIGNIFICANT CHANGE UP (ref 17–32)
COLOR SPEC: YELLOW — SIGNIFICANT CHANGE UP
CREAT SERPL-MCNC: 0.9 MG/DL — SIGNIFICANT CHANGE UP (ref 0.7–1.5)
DIFF PNL FLD: NEGATIVE — SIGNIFICANT CHANGE UP
EGFR: 99 ML/MIN/1.73M2 — SIGNIFICANT CHANGE UP
EOSINOPHIL # BLD AUTO: 0.08 K/UL — SIGNIFICANT CHANGE UP (ref 0–0.7)
EOSINOPHIL NFR BLD AUTO: 2.1 % — SIGNIFICANT CHANGE UP (ref 0–8)
GLUCOSE BLDC GLUCOMTR-MCNC: 108 MG/DL — HIGH (ref 70–99)
GLUCOSE SERPL-MCNC: 104 MG/DL — HIGH (ref 70–99)
GLUCOSE UR QL: NEGATIVE MG/DL — SIGNIFICANT CHANGE UP
HCT VFR BLD CALC: 47.2 % — SIGNIFICANT CHANGE UP (ref 42–52)
HGB BLD-MCNC: 15.8 G/DL — SIGNIFICANT CHANGE UP (ref 14–18)
IMM GRANULOCYTES NFR BLD AUTO: 0 % — LOW (ref 0.1–0.3)
KETONES UR-MCNC: NEGATIVE MG/DL — SIGNIFICANT CHANGE UP
LEUKOCYTE ESTERASE UR-ACNC: NEGATIVE — SIGNIFICANT CHANGE UP
LYMPHOCYTES # BLD AUTO: 1.37 K/UL — SIGNIFICANT CHANGE UP (ref 1.2–3.4)
LYMPHOCYTES # BLD AUTO: 36.1 % — SIGNIFICANT CHANGE UP (ref 20.5–51.1)
MAGNESIUM SERPL-MCNC: 2.3 MG/DL — SIGNIFICANT CHANGE UP (ref 1.8–2.4)
MCHC RBC-ENTMCNC: 32 PG — HIGH (ref 27–31)
MCHC RBC-ENTMCNC: 33.5 G/DL — SIGNIFICANT CHANGE UP (ref 32–37)
MCV RBC AUTO: 95.7 FL — HIGH (ref 80–94)
MONOCYTES # BLD AUTO: 0.53 K/UL — SIGNIFICANT CHANGE UP (ref 0.1–0.6)
MONOCYTES NFR BLD AUTO: 14 % — HIGH (ref 1.7–9.3)
NEUTROPHILS # BLD AUTO: 1.79 K/UL — SIGNIFICANT CHANGE UP (ref 1.4–6.5)
NEUTROPHILS NFR BLD AUTO: 47.3 % — SIGNIFICANT CHANGE UP (ref 42.2–75.2)
NITRITE UR-MCNC: NEGATIVE — SIGNIFICANT CHANGE UP
NRBC # BLD: 0 /100 WBCS — SIGNIFICANT CHANGE UP (ref 0–0)
PH UR: 7 — SIGNIFICANT CHANGE UP (ref 5–8)
PLATELET # BLD AUTO: 219 K/UL — SIGNIFICANT CHANGE UP (ref 130–400)
PMV BLD: 9.1 FL — SIGNIFICANT CHANGE UP (ref 7.4–10.4)
POTASSIUM SERPL-MCNC: 5.1 MMOL/L — HIGH (ref 3.5–5)
POTASSIUM SERPL-SCNC: 5.1 MMOL/L — HIGH (ref 3.5–5)
PROT SERPL-MCNC: 6.5 G/DL — SIGNIFICANT CHANGE UP (ref 6–8)
PROT UR-MCNC: NEGATIVE MG/DL — SIGNIFICANT CHANGE UP
RBC # BLD: 4.93 M/UL — SIGNIFICANT CHANGE UP (ref 4.7–6.1)
RBC # FLD: 12.8 % — SIGNIFICANT CHANGE UP (ref 11.5–14.5)
SODIUM SERPL-SCNC: 140 MMOL/L — SIGNIFICANT CHANGE UP (ref 135–146)
SP GR SPEC: <1.005 — LOW (ref 1–1.03)
UROBILINOGEN FLD QL: 0.2 MG/DL — SIGNIFICANT CHANGE UP (ref 0.2–1)
WBC # BLD: 3.79 K/UL — LOW (ref 4.8–10.8)
WBC # FLD AUTO: 3.79 K/UL — LOW (ref 4.8–10.8)

## 2024-04-24 PROCEDURE — 82962 GLUCOSE BLOOD TEST: CPT

## 2024-04-24 PROCEDURE — 99284 EMERGENCY DEPT VISIT MOD MDM: CPT

## 2024-04-24 PROCEDURE — 85025 COMPLETE CBC W/AUTO DIFF WBC: CPT

## 2024-04-24 PROCEDURE — 36415 COLL VENOUS BLD VENIPUNCTURE: CPT

## 2024-04-24 PROCEDURE — 83735 ASSAY OF MAGNESIUM: CPT

## 2024-04-24 PROCEDURE — 99283 EMERGENCY DEPT VISIT LOW MDM: CPT

## 2024-04-24 PROCEDURE — 87086 URINE CULTURE/COLONY COUNT: CPT

## 2024-04-24 PROCEDURE — 81003 URINALYSIS AUTO W/O SCOPE: CPT

## 2024-04-24 PROCEDURE — 80053 COMPREHEN METABOLIC PANEL: CPT

## 2024-04-24 RX ORDER — SODIUM CHLORIDE 9 MG/ML
1000 INJECTION INTRAMUSCULAR; INTRAVENOUS; SUBCUTANEOUS ONCE
Refills: 0 | Status: COMPLETED | OUTPATIENT
Start: 2024-04-24 | End: 2024-04-24

## 2024-04-24 RX ORDER — PANTOPRAZOLE SODIUM 20 MG/1
1 TABLET, DELAYED RELEASE ORAL
Qty: 0 | Refills: 0 | DISCHARGE

## 2024-04-24 RX ADMIN — SODIUM CHLORIDE 2000 MILLILITER(S): 9 INJECTION INTRAMUSCULAR; INTRAVENOUS; SUBCUTANEOUS at 09:46

## 2024-04-24 NOTE — ED ADULT NURSE NOTE - NSICDXFAMILYHX_GEN_ALL_CORE_FT
FAMILY HISTORY:  Father  Still living? Unknown  Family history of atrial fibrillation, Age at diagnosis: Age Unknown  Family history of diabetes mellitus, Age at diagnosis: Age Unknown

## 2024-04-24 NOTE — ED PROVIDER NOTE - ATTENDING APP SHARED VISIT CONTRIBUTION OF CARE
I personally evaluated the patient. I reviewed the Resident’s or Physician Assistant’s note (as assigned above), and agree with the findings and plan except as documented in my note.    58-year-old man history of GERD paroxysmal A-fib he only gets his A-fib after drinking or from dehydration, Toprol as needed presents to ER complaining of 1 week of urinary frequency no burning no dysuria does endorse polydipsia to compensate.  No fevers no chills no chest pain no abdominal pain or back pain today.  CONSTITUTIONAL: WA / WN / NAD  HEAD: NCAT  EYES: PERRL; EOMI;   ENT: Normal pharynx; mucous membranes pink/moist, no erythema.  NECK: Supple;   ABD: Soft, NT ND no cva   MSK/EXT: No gross deformities; full range of motion.  SKIN: Warm and dry;   NEURO: AAOx3  PSYCH: Memory Intact, Normal Affect

## 2024-04-24 NOTE — ED ADULT NURSE NOTE - NSFALLUNIVINTERV_ED_ALL_ED
Bed/Stretcher in lowest position, wheels locked, appropriate side rails in place/Call bell, personal items and telephone in reach/Instruct patient to call for assistance before getting out of bed/chair/stretcher/Non-slip footwear applied when patient is off stretcher/[place] to call system/Physically safe environment - no spills, clutter or unnecessary equipment/Purposeful proactive rounding/Room/bathroom lighting operational, light cord in reach

## 2024-04-24 NOTE — ED PROVIDER NOTE - NSFOLLOWUPINSTRUCTIONS_ED_ALL_ED_FT
follow up with your PMD as planned and urology     Our Emergency Department Referral Coordinators will be reaching out to you in the next 24-48 hours from 9:00am to 5:00pm to schedule a follow up appointment. Please expect a phone call from the hospital in that time frame. If you do not receive a call or if you have any questions or concerns, you can reach them at   [phone].

## 2024-04-24 NOTE — ED PROVIDER NOTE - PATIENT PORTAL LINK FT
You can access the FollowMyHealth Patient Portal offered by [facility] by registering at the following website: [email or web address]. By joining [facility]’s FollowMyHealth portal, you will also be able to view your health information using other applications (apps) compatible with our system.

## 2024-04-24 NOTE — ED ADULT TRIAGE NOTE - BP NONINVASIVE DIASTOLIC (MM HG)
Left msg to schedule appointment  
Next appt 2/21/2022  Last appt 7-26-21    Refill request for/Last refilled info;  valACYclovir (VALTREX) 500 MG tablet 30 tablet 1 9/9/2021     Sig: TAKE ONE TABLET BY MOUTH EVERY DAY      Orders pended, and routed to provider for approval.         
Next appt None  Last appt 7-26-21    Refill request for/Last refilled info;  valACYclovir (VALTREX) 500 MG tablet 30 tablet 1 9/9/2021     Sig: TAKE ONE TABLET BY MOUTH EVERY DAY      Refill unable to be completed per standing protocol due to; Non protocol med    Orders pended, and routed to provider for approval.   Please route any notes back to your nursing pool via patient call NOT Rx Auth.  Thank you, Refill Center Staff      
Patient has not been seen since August of 2020. Please call patient to schedule an appointment prior to refills being sent.   
Patient scheduled for 2/21/22  
92

## 2024-04-24 NOTE — ED PROVIDER NOTE - CLINICAL SUMMARY MEDICAL DECISION MAKING FREE TEXT BOX
58-year-old man history of GERD paroxysmal A-fib he only gets his A-fib after drinking or from dehydration, Toprol as needed presents to ER complaining of 1 week of urinary frequency no burning no dysuria does endorse polydipsia to compensate.  No fevers no chills no chest pain no abdominal pain or back pain today. vs reviewed labs obtained and reviewed UA negative. Patient a spoken to in detail about results  All questions addressed.  Results of ED work up discussed and patient given a copy of the results. Patient has proper follow up. Return precautions given.

## 2024-04-25 LAB
CULTURE RESULTS: NO GROWTH — SIGNIFICANT CHANGE UP
SPECIMEN SOURCE: SIGNIFICANT CHANGE UP

## 2024-04-26 NOTE — CHART NOTE - NSCHARTNOTEFT_GEN_A_CORE
patient called back in and stated he has self scheduled 4/26 LW
[facility] MRN [id number] left vm 4/25 lw  left vm 4/26 LW

## 2024-06-19 ENCOUNTER — APPOINTMENT (OUTPATIENT)
Dept: ORTHOPEDIC SURGERY | Facility: CLINIC | Age: 59
End: 2024-06-19
Payer: COMMERCIAL

## 2024-06-19 DIAGNOSIS — M77.8 OTHER ENTHESOPATHIES, NOT ELSEWHERE CLASSIFIED: ICD-10-CM

## 2024-06-19 DIAGNOSIS — M54.50 LOW BACK PAIN, UNSPECIFIED: ICD-10-CM

## 2024-06-19 DIAGNOSIS — G89.29 LOW BACK PAIN, UNSPECIFIED: ICD-10-CM

## 2024-06-19 DIAGNOSIS — M19.012 PRIMARY OSTEOARTHRITIS, LEFT SHOULDER: ICD-10-CM

## 2024-06-19 PROCEDURE — 99213 OFFICE O/P EST LOW 20 MIN: CPT

## 2024-06-20 PROBLEM — M77.8 TENDINITIS OF RIGHT SHOULDER: Status: ACTIVE | Noted: 2024-03-22

## 2024-06-20 PROBLEM — M54.50 CHRONIC MIDLINE LOW BACK PAIN WITHOUT SCIATICA: Status: ACTIVE | Noted: 2024-03-22

## 2024-06-20 PROBLEM — M19.012 ARTHRITIS OF SHOULDER REGION, LEFT: Status: ACTIVE | Noted: 2024-03-22

## 2024-06-20 NOTE — REASON FOR VISIT
[FreeTextEntry2] : right shoulder and lower back Doing some therapy which is helpful still on Plavix did get MRI lumbar spine 4/3/2024: Moderate spinal stenosis L4-5 mild stenosis at L3-4

## 2024-06-20 NOTE — ASSESSMENT
[FreeTextEntry1] :  orthopedically he is stable he is engaged in physical therapy   he is on Plavix so i would be hesitant to consider NSAIDs  no reason for any injections I deferred on any additional diagnostics   based on his history symptoms and findings I do believe he is "totally disabled unable to work and I will see him in a few months

## 2024-06-20 NOTE — HISTORY OF PRESENT ILLNESS
[de-identified] : This is a 58-year-old gentleman retired from the [employer] 35 years of service in January recently had surgery for his right shoulder by Dr. Cardenas with several anchors in the biceps and rotator cuff ongoing therapy right now with Donaldo Almendarez he had a fall down a flight of stairs while still on the job November 16, 2023 he does have difficulty sleeping pain at rest for activity 6 no allergies does not smoke he is on Plavix did have a cardiac event last year but has  no stents.  He did have an injury to the left shoulder back in May 2023 when he slipped on some stairs injuring his back and left shoulder he did return to work from that injury Does report some persistent pain in the lower lumbar region into both buttocks morning stiffness

## 2024-09-19 ENCOUNTER — APPOINTMENT (OUTPATIENT)
Dept: ORTHOPEDIC SURGERY | Facility: CLINIC | Age: 59
End: 2024-09-19
Payer: COMMERCIAL

## 2024-09-19 DIAGNOSIS — M54.50 LOW BACK PAIN, UNSPECIFIED: ICD-10-CM

## 2024-09-19 DIAGNOSIS — S49.92XA UNSPECIFIED INJURY OF LEFT SHOULDER AND UPPER ARM, INITIAL ENCOUNTER: ICD-10-CM

## 2024-09-19 DIAGNOSIS — M77.8 OTHER ENTHESOPATHIES, NOT ELSEWHERE CLASSIFIED: ICD-10-CM

## 2024-09-19 DIAGNOSIS — G89.29 LOW BACK PAIN, UNSPECIFIED: ICD-10-CM

## 2024-09-19 DIAGNOSIS — M19.012 PRIMARY OSTEOARTHRITIS, LEFT SHOULDER: ICD-10-CM

## 2024-09-19 DIAGNOSIS — M50.90 CERVICAL DISC DISORDER, UNSPECIFIED, UNSPECIFIED CERVICAL REGION: ICD-10-CM

## 2024-09-19 PROCEDURE — 99213 OFFICE O/P EST LOW 20 MIN: CPT

## 2024-09-19 NOTE — IMAGING
[de-identified] : Pleasant easy to examine no distress neck has good motion mild spasm right shoulder guarded motion in all planes tested with some apprehension clinically located healed incisions biceps appears intact elbow motion is full  Left shoulder mild moderate limits in motion mild impingement  MRI left shoulder 5/22/2023:This MRI shows severe glenohumeral osteoarthritis with high-grade chondral loss along the posterior aspect of the glenoid, bony glenoid erosion contributing to increased glenoid retroversion. Chondral loss along the superior and posterior aspect of the humeral head with marginal osteophytes. Diffuse degenerative torn glenoid labrum. There is a small joint effusion with debris's and joint bodies.  Moderate supraspinatus, infraspinatus and upper subscapularis tendinosis. There is a small low-grade tear of the posterior footprint of the supraspinatus tendon, extending into the anterior infraspinatus tendon. There is no high-grade or full-thickness tear.  Lumbar spine tight dorsolumbar fascia and hamstrings negative straight leg raise bilaterally normal gait X-rays lumbar spine sacral spine today mild discogenic disease MRI lumbar spine 4/3/2024 moderate stenosis 4 5 mild stenosis 3 4 ***MRI right shoulder 11/29/2023: Focal full-thickness cuff tear probable intrasubstance tear subscap displaced long head of the biceps labral tear capsulitis and AC joint arthritis Operative report right shoulder 1/26/2024 cuff repair, biceps tenodesis, synovectomy, intra-articular debridement of SLAP tear lysis of adhesions and subacromial decompression

## 2024-09-19 NOTE — ASSESSMENT
[FreeTextEntry1] :  orthopedically he is stable he is engaged in physical therapy   he is on Plavix so i would be hesitant to consider NSAIDs  no reason for any injections I deferred on any additional diagnostics    based on his history symptoms and findings I do believe he is "totally disabled unable to work and I will see him in a few months  I asked that he get me copies of the nerve tests and cervical MRI

## 2024-09-19 NOTE — REASON FOR VISIT
[FreeTextEntry2] : Patient is here today for right shoulder and lower back pain  Still on Plavix still sees neurology new nerve test and cervical MRI and is now on tizanidine

## 2024-09-19 NOTE — IMAGING
[de-identified] : Pleasant easy to examine no distress neck has good motion mild spasm right shoulder guarded motion in all planes tested with some apprehension clinically located healed incisions biceps appears intact elbow motion is full  Left shoulder mild moderate limits in motion mild impingement  MRI left shoulder 5/22/2023:This MRI shows severe glenohumeral osteoarthritis with high-grade chondral loss along the posterior aspect of the glenoid, bony glenoid erosion contributing to increased glenoid retroversion. Chondral loss along the superior and posterior aspect of the humeral head with marginal osteophytes. Diffuse degenerative torn glenoid labrum. There is a small joint effusion with debris's and joint bodies.  Moderate supraspinatus, infraspinatus and upper subscapularis tendinosis. There is a small low-grade tear of the posterior footprint of the supraspinatus tendon, extending into the anterior infraspinatus tendon. There is no high-grade or full-thickness tear.  Lumbar spine tight dorsolumbar fascia and hamstrings negative straight leg raise bilaterally normal gait X-rays lumbar spine sacral spine today mild discogenic disease MRI lumbar spine 4/3/2024 moderate stenosis 4 5 mild stenosis 3 4 ***MRI right shoulder 11/29/2023: Focal full-thickness cuff tear probable intrasubstance tear subscap displaced long head of the biceps labral tear capsulitis and AC joint arthritis Operative report right shoulder 1/26/2024 cuff repair, biceps tenodesis, synovectomy, intra-articular debridement of SLAP tear lysis of adhesions and subacromial decompression

## 2024-09-19 NOTE — HISTORY OF PRESENT ILLNESS
[de-identified] : This is a 58-year-old gentleman retired from the [employer] 35 years of service in January recently had surgery for his right shoulder by Dr. Cardenas with several anchors in the biceps and rotator cuff ongoing therapy right now with Donaldo Almendarez he had a fall down a flight of stairs while still on the job November 16, 2023 he does have difficulty sleeping pain at rest for activity 6 no allergies does not smoke he is on Plavix did have a cardiac event last year but has  no stents.  He did have an injury to the left shoulder back in May 2023 when he slipped on some stairs injuring his back and left shoulder he did return to work from that injury Does report some persistent pain in the lower lumbar region into both buttocks morning stiffness

## 2024-09-19 NOTE — HISTORY OF PRESENT ILLNESS
[de-identified] : This is a 58-year-old gentleman retired from the [employer] 35 years of service in January recently had surgery for his right shoulder by Dr. Cardenas with several anchors in the biceps and rotator cuff ongoing therapy right now with Donaldo Almendarez he had a fall down a flight of stairs while still on the job November 16, 2023 he does have difficulty sleeping pain at rest for activity 6 no allergies does not smoke he is on Plavix did have a cardiac event last year but has  no stents.  He did have an injury to the left shoulder back in May 2023 when he slipped on some stairs injuring his back and left shoulder he did return to work from that injury Does report some persistent pain in the lower lumbar region into both buttocks morning stiffness

## 2024-11-13 ENCOUNTER — APPOINTMENT (OUTPATIENT)
Dept: SLEEP CENTER | Facility: HOSPITAL | Age: 59
End: 2024-11-13

## 2024-12-11 ENCOUNTER — APPOINTMENT (OUTPATIENT)
Dept: ORTHOPEDIC SURGERY | Facility: CLINIC | Age: 59
End: 2024-12-11
Payer: COMMERCIAL

## 2024-12-11 DIAGNOSIS — M77.8 OTHER ENTHESOPATHIES, NOT ELSEWHERE CLASSIFIED: ICD-10-CM

## 2024-12-11 DIAGNOSIS — M54.12 RADICULOPATHY, CERVICAL REGION: ICD-10-CM

## 2024-12-11 DIAGNOSIS — M19.012 PRIMARY OSTEOARTHRITIS, LEFT SHOULDER: ICD-10-CM

## 2024-12-11 DIAGNOSIS — M54.16 RADICULOPATHY, LUMBAR REGION: ICD-10-CM

## 2024-12-11 DIAGNOSIS — G89.29 LOW BACK PAIN, UNSPECIFIED: ICD-10-CM

## 2024-12-11 DIAGNOSIS — M50.90 CERVICAL DISC DISORDER, UNSPECIFIED, UNSPECIFIED CERVICAL REGION: ICD-10-CM

## 2024-12-11 DIAGNOSIS — M54.50 LOW BACK PAIN, UNSPECIFIED: ICD-10-CM

## 2024-12-11 PROCEDURE — 99213 OFFICE O/P EST LOW 20 MIN: CPT

## 2025-04-30 ENCOUNTER — APPOINTMENT (OUTPATIENT)
Dept: ORTHOPEDIC SURGERY | Facility: CLINIC | Age: 60
End: 2025-04-30
Payer: COMMERCIAL

## 2025-04-30 DIAGNOSIS — M54.16 RADICULOPATHY, LUMBAR REGION: ICD-10-CM

## 2025-04-30 DIAGNOSIS — M19.012 PRIMARY OSTEOARTHRITIS, LEFT SHOULDER: ICD-10-CM

## 2025-04-30 DIAGNOSIS — M77.8 OTHER ENTHESOPATHIES, NOT ELSEWHERE CLASSIFIED: ICD-10-CM

## 2025-04-30 DIAGNOSIS — M50.90 CERVICAL DISC DISORDER, UNSPECIFIED, UNSPECIFIED CERVICAL REGION: ICD-10-CM

## 2025-04-30 DIAGNOSIS — M51.9 UNSPECIFIED THORACIC, THORACOLUMBAR AND LUMBOSACRAL INTERVERTEBRAL DISC DISORDER: ICD-10-CM

## 2025-04-30 DIAGNOSIS — M54.12 RADICULOPATHY, CERVICAL REGION: ICD-10-CM

## 2025-04-30 PROCEDURE — 99213 OFFICE O/P EST LOW 20 MIN: CPT

## 2025-09-10 ENCOUNTER — APPOINTMENT (OUTPATIENT)
Dept: ORTHOPEDIC SURGERY | Facility: CLINIC | Age: 60
End: 2025-09-10
Payer: COMMERCIAL

## 2025-09-10 DIAGNOSIS — M50.90 CERVICAL DISC DISORDER, UNSPECIFIED, UNSPECIFIED CERVICAL REGION: ICD-10-CM

## 2025-09-10 DIAGNOSIS — M51.9 UNSPECIFIED THORACIC, THORACOLUMBAR AND LUMBOSACRAL INTERVERTEBRAL DISC DISORDER: ICD-10-CM

## 2025-09-10 DIAGNOSIS — G89.29 LOW BACK PAIN, UNSPECIFIED: ICD-10-CM

## 2025-09-10 DIAGNOSIS — M54.16 RADICULOPATHY, LUMBAR REGION: ICD-10-CM

## 2025-09-10 DIAGNOSIS — M19.012 PRIMARY OSTEOARTHRITIS, LEFT SHOULDER: ICD-10-CM

## 2025-09-10 DIAGNOSIS — M54.50 LOW BACK PAIN, UNSPECIFIED: ICD-10-CM

## 2025-09-10 DIAGNOSIS — M54.12 RADICULOPATHY, CERVICAL REGION: ICD-10-CM

## 2025-09-10 DIAGNOSIS — M77.8 OTHER ENTHESOPATHIES, NOT ELSEWHERE CLASSIFIED: ICD-10-CM

## 2025-09-10 PROCEDURE — 99213 OFFICE O/P EST LOW 20 MIN: CPT
